# Patient Record
Sex: FEMALE | Race: WHITE | URBAN - METROPOLITAN AREA
[De-identification: names, ages, dates, MRNs, and addresses within clinical notes are randomized per-mention and may not be internally consistent; named-entity substitution may affect disease eponyms.]

---

## 2018-05-24 PROBLEM — Z01.89 ENCOUNTER FOR LABORATORY EXAMINATION: Status: ACTIVE | Noted: 2018-05-24

## 2018-05-24 PROBLEM — Z13.79 GENETIC SCREENING: Status: ACTIVE | Noted: 2018-05-24

## 2018-05-24 PROBLEM — O35.BXX0 FETAL CARDIAC ECHOGENIC FOCUS, ANTEPARTUM: Status: ACTIVE | Noted: 2018-05-24

## 2018-06-23 PROBLEM — Z13.79 GENETIC SCREENING: Status: RESOLVED | Noted: 2018-05-24 | Resolved: 2018-06-23

## 2018-07-05 PROBLEM — Z01.89 ENCOUNTER FOR LABORATORY EXAMINATION: Status: RESOLVED | Noted: 2018-05-24 | Resolved: 2018-07-05

## 2018-09-19 PROBLEM — O35.BXX0 FETAL CARDIAC ECHOGENIC FOCUS, ANTEPARTUM: Status: RESOLVED | Noted: 2018-05-24 | Resolved: 2018-09-19

## 2020-04-14 PROBLEM — Z80.0 FAMILY HISTORY OF CHOLANGIOCARCINOMA: Status: ACTIVE | Noted: 2020-04-14

## 2021-11-23 ENCOUNTER — E-VISIT (OUTPATIENT)
Dept: PRIMARY CARE CLINIC | Age: 36
End: 2021-11-23
Payer: MEDICAID

## 2021-11-23 DIAGNOSIS — B96.89 ACUTE BACTERIAL SINUSITIS: Primary | ICD-10-CM

## 2021-11-23 DIAGNOSIS — J01.90 ACUTE BACTERIAL SINUSITIS: Primary | ICD-10-CM

## 2021-11-23 PROCEDURE — 99421 OL DIG E/M SVC 5-10 MIN: CPT | Performed by: NURSE PRACTITIONER

## 2021-11-23 RX ORDER — AZITHROMYCIN 250 MG/1
TABLET, FILM COATED ORAL
Qty: 6 TABLET | Refills: 0 | Status: SHIPPED | OUTPATIENT
Start: 2021-11-23 | End: 2021-12-03

## 2021-11-23 ASSESSMENT — LIFESTYLE VARIABLES: SMOKING_STATUS: NO, I'VE NEVER SMOKED

## 2021-11-23 NOTE — PROGRESS NOTES
Reviewed questionnaire    Reviewed meds/allergies    Dx Sinusitis    Plan Rx given for zpack, follow up with your PCP if no improvement in symptoms with use of antibiotic    Time spent on visit 5 min

## 2022-08-29 PROBLEM — D69.6 THROMBOCYTOPENIA (HCC): Status: ACTIVE | Noted: 2022-08-29

## 2022-08-29 PROBLEM — G43.009 MIGRAINE WITHOUT AURA AND WITHOUT STATUS MIGRAINOSUS, NOT INTRACTABLE: Status: ACTIVE | Noted: 2022-08-29

## 2022-08-29 PROBLEM — D69.3 IDIOPATHIC THROMBOCYTOPENIC PURPURA (ITP) (HCC): Status: ACTIVE | Noted: 2022-08-29

## 2022-08-29 PROBLEM — R74.8 ELEVATED LIVER ENZYMES: Status: ACTIVE | Noted: 2020-03-01
